# Patient Record
Sex: MALE | URBAN - METROPOLITAN AREA
[De-identification: names, ages, dates, MRNs, and addresses within clinical notes are randomized per-mention and may not be internally consistent; named-entity substitution may affect disease eponyms.]

---

## 2021-02-18 ENCOUNTER — VIRTUAL VISIT (OUTPATIENT)
Dept: FAMILY MEDICINE CLINIC | Age: 56
End: 2021-02-18

## 2021-02-18 DIAGNOSIS — F41.9 ANXIETY: ICD-10-CM

## 2021-02-18 DIAGNOSIS — K76.0 FATTY LIVER: ICD-10-CM

## 2021-02-18 DIAGNOSIS — F34.1 DYSTHYMIA: ICD-10-CM

## 2021-02-18 DIAGNOSIS — Z72.0 CONTINUOUS TOBACCO ABUSE: ICD-10-CM

## 2021-02-18 DIAGNOSIS — Z76.89 ENCOUNTER TO ESTABLISH CARE: ICD-10-CM

## 2021-02-18 DIAGNOSIS — I10 ESSENTIAL HYPERTENSION: Primary | ICD-10-CM

## 2021-02-18 DIAGNOSIS — Z13.220 LIPID SCREENING: ICD-10-CM

## 2021-02-18 DIAGNOSIS — J43.8 OTHER EMPHYSEMA (HCC): ICD-10-CM

## 2021-02-18 PROBLEM — R79.89 ABNORMAL CORTISOL LEVEL: Status: RESOLVED | Noted: 2018-10-05 | Resolved: 2021-02-18

## 2021-02-18 PROBLEM — R79.89 ABNORMAL CORTISOL LEVEL: Status: ACTIVE | Noted: 2018-10-05

## 2021-02-18 PROCEDURE — 99204 OFFICE O/P NEW MOD 45 MIN: CPT | Performed by: NURSE PRACTITIONER

## 2021-02-18 RX ORDER — ALPRAZOLAM 0.5 MG/1
0.5 TABLET ORAL
COMMUNITY
End: 2021-02-18

## 2021-02-18 RX ORDER — LISINOPRIL 40 MG/1
40 TABLET ORAL DAILY
COMMUNITY
End: 2021-02-18 | Stop reason: SDUPTHER

## 2021-02-18 RX ORDER — DULOXETIN HYDROCHLORIDE 60 MG/1
60 CAPSULE, DELAYED RELEASE ORAL DAILY
COMMUNITY
End: 2021-02-18

## 2021-02-18 RX ORDER — LISINOPRIL 40 MG/1
40 TABLET ORAL DAILY
Qty: 90 TAB | Refills: 0 | Status: SHIPPED | OUTPATIENT
Start: 2021-02-18 | End: 2021-06-02 | Stop reason: SDUPTHER

## 2021-02-18 RX ORDER — ALPRAZOLAM 0.5 MG/1
0.5 TABLET ORAL
Status: CANCELLED | OUTPATIENT
Start: 2021-02-18

## 2021-02-18 RX ORDER — BUSPIRONE HYDROCHLORIDE 10 MG/1
10 TABLET ORAL 3 TIMES DAILY
Qty: 90 TAB | Refills: 0 | Status: SHIPPED | OUTPATIENT
Start: 2021-02-18

## 2021-02-18 RX ORDER — DULOXETIN HYDROCHLORIDE 60 MG/1
60 CAPSULE, DELAYED RELEASE ORAL DAILY
COMMUNITY
End: 2021-04-22

## 2021-02-18 RX ORDER — ALBUTEROL SULFATE 90 UG/1
AEROSOL, METERED RESPIRATORY (INHALATION)
COMMUNITY

## 2021-02-18 RX ORDER — MOMETASONE FUROATE AND FORMOTEROL FUMARATE DIHYDRATE 200; 5 UG/1; UG/1
2 AEROSOL RESPIRATORY (INHALATION) 2 TIMES DAILY
COMMUNITY
End: 2021-06-02 | Stop reason: SDUPTHER

## 2021-02-18 RX ORDER — ALPRAZOLAM 1 MG/1
1 TABLET ORAL
COMMUNITY

## 2021-02-18 NOTE — PROGRESS NOTES
Chief Complaint   Patient presents with   [address]     moved from [place]        1. Have you been to the ER, urgent care clinic since your last visit? Hospitalized since your last visit? No    2. Have you seen or consulted any other health care providers outside of the [address] [place] [address] René since your last visit? Include any pap smears or colon screening. No    Chief Complaint   Patient presents with   [address]     moved from [email or web address]        Chief Complaint   Patient presents with   [address]     moved from [email or web address]        3 most recent PHQ Screens 2/18/2021   Little interest or pleasure in doing things Nearly every day   Feeling down, depressed, irritable, or hopeless Nearly every day   Total Score PHQ 2 6     Fall Risk Assessment, last 12 mths 2/18/2021   Able to walk? Yes   Fall in past 12 months? 0   Do you feel unsteady? 0   Are you worried about falling 0               Learning Assessment 2/18/2021   PRIMARY LEARNER Patient   HIGHEST LEVEL OF EDUCATION - PRIMARY LEARNER  DID NOT GRADUATE HIGH SCHOOL   BARRIERS PRIMARY LEARNER NONE   CO-LEARNER CAREGIVER No   PRIMARY LANGUAGE ENGLISH   LEARNER PREFERENCE PRIMARY LISTENING   ANSWERED BY liam ARENAS SELF     There were no vitals taken for this visit.

## 2021-02-18 NOTE — PROGRESS NOTES
[place]               [address]               [phone]      Lorena Fowler is a 48 y.o. male who was seen by synchronous (real-time) audio-video technology on 2/18/2021. Consent: Lorena Fowler, who was seen by synchronous (real-time) audio-video technology, and/or his healthcare decision maker, is aware that this patient-initiated, Telehealth encounter on 2/18/2021 is a billable service, with coverage as determined by his insurance carrier. He is aware that he may receive a bill and has provided verbal consent to proceed: Yes. Assessment & Plan:   Diagnoses and all orders for this visit:    1. Essential hypertension  -     lisinopriL (PRINIVIL, ZESTRIL) 40 mg tablet; Take 1 Tab by mouth daily.  -     METABOLIC PANEL, COMPREHENSIVE; Future  Endorses medication compliance  Home blood pressure checks are stable, continue same medications  Have asked him to come in for labs and blood pressure check  Refill provided    2. Other emphysema ([facility] [address]  -     CBC WITH AUTOMATED DIFF; Future  History of emphysema, currently controlled on his medication regimen, has no need to use rescue inhaler  We will obtain CBC    3. Dysthymia  -     TSH 3RD GENERATION; Future  -     T4, FREE; Future  Endorses problems with depression/anxiety  He is currently on Cymbalta daily  Have him come in for labs to check his thyroid as a possible contributing factor    4. Fatty liver  -     METABOLIC PANEL, COMPREHENSIVE; Future  -     LIPID PANEL; Future  History of a fatty liver, have him come in for follow-up labs    5. Anxiety  -     busPIRone (BUSPAR) 10 mg tablet; Take 1 Tab by mouth three (3) times daily.  -     TSH 3RD GENERATION; Future  -     T4, FREE;  Future  Endorses a problem with anxiety, states when he gets very upset or frustrated he will begin to yell and scream, in the past he used Xanax  I explained to him I do not prescribe controlled substances on an ongoing basis  He is willing to try BuSpar    6. Continuous tobacco abuse  Encourage smoking cessation  He currently has a 31.5 pack year history  Denies any signs and symptoms of lung cancer    7. Lipid screening  -     LIPID PANEL; Future  Health maintenance needs addressed    8. Encounter to establish care  Visit today to establish care, prior PCP was in [place]    Follow-up and Dispositions    · Return for ASAP, Labs, nurse visit, b/p check, AND 1 month, New med/buspar, 15 min, VV.             712  Subjective:   Shama Cloón is a 48 y.o. male who was seen for   [address] (moved from [email or web address] - [email or web address] )  moved from [address] [place] [address] about 5 months ago    Hypertension:   Patient reports taking medications as instructed. yes   Medication side effects noted. no  Headache upon wakening. no   Home BP monitoring in range of 676'P systolic. Do you experience chest pain/pressure or SOB with exertion? no  Maintain a low salt diet? yes  Key CAD CHF Meds             lisinopriL (PRINIVIL, ZESTRIL) 40 mg tablet (Taking) Take 1 Tab by mouth daily. COPD  Diagnosed 1/2020 in [place]  Had PFT's completed along with CTscan  Using dulera and albuterol  Using dulera daily as ordered  Has not needed to use the albuterol inhaler  Continues to smoke    Depression/anxiety  Taking cymbalta daily  Helps some but still as anxiety where if he gets frustrated would get angry and yell and screem  Would take xanax, has not had for a long time weaned himself off    Prior to Admission medications    Medication Sig Start Date End Date Taking? Authorizing Provider   albuterol (PROVENTIL HFA, VENTOLIN HFA, PROAIR HFA) 90 mcg/actuation inhaler Take  by inhalation. Yes Provider, Historical   mometasone-formoterol (Dulera) 200-5 mcg/actuation HFA inhaler Take 2 Puffs by inhalation two (2) times a day. Yes Provider, Historical   lisinopriL (PRINIVIL, ZESTRIL) 40 mg tablet Take 1 Tab by mouth daily.  2/18/21  Yes Gaston Donovan NP   DULoxetine (CYMBALTA) 60 mg capsule Take 60 mg by mouth daily. Yes Provider, Historical   busPIRone (BUSPAR) 10 mg tablet Take 1 Tab by mouth three (3) times daily. 2/18/21  Yes Caroline Scott NP   ALPRAZolam Desiree Zavalabus) 1 mg tablet Take 1 mg by mouth two (2) times daily as needed. Provider, Historical     No Known Allergies    Patient Active Problem List   Diagnosis Code    Essential hypertension I10    Dysthymia F34.1    Other emphysema (HCC) J43.8    Fatty liver K76.0    Continuous tobacco abuse Z72.0     History reviewed. No pertinent surgical history. Family History   Problem Relation Age of Onset    Gall Bladder Disease Mother     Liver Disease Father     Alcohol abuse Father      Social History     Tobacco Use    Smoking status: Current Every Day Smoker     Packs/day: 0.50     Years: 35.00     Pack years: 17.50   Substance Use Topics    Alcohol use: Yes     Alcohol/week: 25.0 standard drinks     Types: 25 Cans of beer per week     Binge frequency: Weekly       ROS  As stated in HPI, otherwise all others negative. Objective: There were no vitals taken for this visit. General: alert, cooperative, no distress   Mental  status: normal mood, behavior, speech, dress, motor activity, and thought processes, able to follow commands   HENT: NCAT   Neck: no visualized mass   Resp: no respiratory distress   Neuro: no gross deficits   Skin: no discoloration or lesions of concern on visible areas   Psychiatric: normal affect, consistent with stated mood, no evidence of hallucinations     Additional exam findings: We discussed the expected course, resolution and complications of the diagnosis(es) in detail. Medication risks, benefits, costs, interactions, and alternatives were discussed as indicated. I advised him to contact the office if his condition worsens, changes or fails to improve as anticipated. He expressed understanding with the diagnosis(es) and plan.      Princess Cloud is a 48 y.o. male who was evaluated by a video visit encounter for concerns as above. Patient identification was verified prior to start of the visit. A caregiver was present when appropriate. Due to this being a TeleHealth encounter (During [facility]-26 public health emergency), evaluation of the following organ systems was limited: Vitals/Constitutional/EENT/Resp/CV/GI//MS/Neuro/Skin/Heme-Lymph-Imm. Pursuant to the emergency declaration under the [address] [place] [address], [facility] waiver authority and the [employer] Act, this Virtual  Visit was conducted, with patient's (and/or legal guardian's) consent, to reduce the patient's risk of exposure to COVID-19 and provide necessary medical care. Services were provided through a video synchronous discussion virtually to substitute for in-person clinic visit. Patient and provider were located at their individual homes. An After Visit Summary was printed and given to the patient. All diagnosis have been discussed with the patient and all of the patient's questions have been answered. Follow-up and Dispositions    · Return for ASAP, Labs, nurse visit, b/p check, AND 1 month, New med/buspar, 15 min, VV. Bryson Desai, [address] C[place].   Marce Goldsmith

## 2021-03-03 ENCOUNTER — APPOINTMENT (OUTPATIENT)
Dept: FAMILY MEDICINE CLINIC | Age: 56
End: 2021-03-03

## 2021-03-03 DIAGNOSIS — F34.1 DYSTHYMIA: ICD-10-CM

## 2021-03-03 DIAGNOSIS — Z13.220 LIPID SCREENING: ICD-10-CM

## 2021-03-03 DIAGNOSIS — I10 ESSENTIAL HYPERTENSION: ICD-10-CM

## 2021-03-03 DIAGNOSIS — F41.9 ANXIETY: ICD-10-CM

## 2021-03-03 DIAGNOSIS — K76.0 FATTY LIVER: ICD-10-CM

## 2021-03-03 DIAGNOSIS — J43.8 OTHER EMPHYSEMA (HCC): ICD-10-CM

## 2021-03-04 LAB
ALBUMIN SERPL-MCNC: 4.5 G/DL (ref 3.8–4.9)
ALBUMIN/GLOB SERPL: 1.8 {RATIO} (ref 1.2–2.2)
ALP SERPL-CCNC: 97 IU/L (ref 39–117)
ALT SERPL-CCNC: 32 IU/L (ref 0–44)
AST SERPL-CCNC: 21 IU/L (ref 0–40)
BASOPHILS # BLD AUTO: 0 X10E3/UL (ref 0–0.2)
BASOPHILS NFR BLD AUTO: 1 %
BILIRUB SERPL-MCNC: 0.4 MG/DL (ref 0–1.2)
BUN SERPL-MCNC: 12 MG/DL (ref 6–24)
BUN/CREAT SERPL: 11 (ref 9–20)
CALCIUM SERPL-MCNC: 8.9 MG/DL (ref 8.7–10.2)
CHLORIDE SERPL-SCNC: 100 MMOL/L (ref 96–106)
CHOLEST SERPL-MCNC: 257 MG/DL (ref 100–199)
CO2 SERPL-SCNC: 23 MMOL/L (ref 20–29)
CREAT SERPL-MCNC: 1.09 MG/DL (ref 0.76–1.27)
EOSINOPHIL # BLD AUTO: 0.2 X10E3/UL (ref 0–0.4)
EOSINOPHIL NFR BLD AUTO: 3 %
ERYTHROCYTE [DISTWIDTH] IN BLOOD BY AUTOMATED COUNT: 13.3 % (ref 11.6–15.4)
GLOBULIN SER CALC-MCNC: 2.5 G/DL (ref 1.5–4.5)
GLUCOSE SERPL-MCNC: 110 MG/DL (ref 65–99)
HCT VFR BLD AUTO: 42.7 % (ref 37.5–51)
HDLC SERPL-MCNC: 59 MG/DL
HGB BLD-MCNC: 14.9 G/DL (ref 13–17.7)
IMM GRANULOCYTES # BLD AUTO: 0 X10E3/UL (ref 0–0.1)
IMM GRANULOCYTES NFR BLD AUTO: 1 %
IMP & REVIEW OF LAB RESULTS: NORMAL
LDLC SERPL CALC-MCNC: 169 MG/DL (ref 0–99)
LYMPHOCYTES # BLD AUTO: 1.7 X10E3/UL (ref 0.7–3.1)
LYMPHOCYTES NFR BLD AUTO: 24 %
MCH RBC QN AUTO: 31.7 PG (ref 26.6–33)
MCHC RBC AUTO-ENTMCNC: 34.9 G/DL (ref 31.5–35.7)
MCV RBC AUTO: 91 FL (ref 79–97)
MONOCYTES # BLD AUTO: 0.5 X10E3/UL (ref 0.1–0.9)
MONOCYTES NFR BLD AUTO: 6 %
NEUTROPHILS # BLD AUTO: 4.8 X10E3/UL (ref 1.4–7)
NEUTROPHILS NFR BLD AUTO: 65 %
PLATELET # BLD AUTO: 283 X10E3/UL (ref 150–450)
POTASSIUM SERPL-SCNC: 4.7 MMOL/L (ref 3.5–5.2)
PROT SERPL-MCNC: 7 G/DL (ref 6–8.5)
RBC # BLD AUTO: 4.7 X10E6/UL (ref 4.14–5.8)
SODIUM SERPL-SCNC: 138 MMOL/L (ref 134–144)
T4 FREE SERPL-MCNC: 1.23 NG/DL (ref 0.82–1.77)
TRIGL SERPL-MCNC: 159 MG/DL (ref 0–149)
TSH SERPL DL<=0.005 MIU/L-ACNC: 0.78 UIU/ML (ref 0.45–4.5)
VLDLC SERPL CALC-MCNC: 29 MG/DL (ref 5–40)
WBC # BLD AUTO: 7.2 X10E3/UL (ref 3.4–10.8)

## 2021-04-22 ENCOUNTER — VIRTUAL VISIT (OUTPATIENT)
Dept: FAMILY MEDICINE CLINIC | Age: 56
End: 2021-04-22
Payer: MEDICAID

## 2021-04-22 DIAGNOSIS — Z13.31 POSITIVE DEPRESSION SCREENING: ICD-10-CM

## 2021-04-22 DIAGNOSIS — F33.1 MODERATE EPISODE OF RECURRENT MAJOR DEPRESSIVE DISORDER (HCC): Primary | ICD-10-CM

## 2021-04-22 PROCEDURE — 99212 OFFICE O/P EST SF 10 MIN: CPT | Performed by: NURSE PRACTITIONER

## 2021-04-22 RX ORDER — DESVENLAFAXINE 50 MG/1
50 TABLET, EXTENDED RELEASE ORAL DAILY
Qty: 30 TAB | Refills: 1 | Status: SHIPPED | OUTPATIENT
Start: 2021-04-22

## 2021-04-22 NOTE — PROGRESS NOTES
Shon North is a 48 y.o. male, evaluated via audio-only technology on 4/22/2021 for No chief complaint on file. .    Assessment & Plan:     Diagnoses and all orders for this visit:    1. Moderate episode of recurrent major depressive disorder (HCC)  -     desvenlafaxine ER 50 mg Tb24 ER tablet; Take 1 Tab by mouth daily. Stopped his previous medications that I have ordered for him as he said that they were not working, I encouraged him to contact me prior to stopping his medications as some of them may need to be tapered off or there is the possibility of adjusting the dosages if he is not getting the desired results, he verbalized understanding  Will start him on desvenlafaxine and have him follow-up in 4 weeks to evaluate effectiveness  2. Positive depression screening  Depression screen positive, PHQ 9 Score: 12, C-SSRS completed and medication was prescribed, drug therapy education given - patient will call for any significant medication side effects or worsening symptoms of depression. Follow-up and Dispositions    · Return in about 4 weeks (around 5/20/2021) for depression, New med, 15 min, VV.         12  Subjective:   No chief complaint on file. Depression  Currently living in a hotel with wife and son  Stopped Buspar and Cymbalta  Stated they were not working  Has tried other antidepressants but he does not remember the names  ETOH: 5 days a week, 4-5 shots and 2-3 beers a night  Denies SI or HI      Prior to Admission medications    Medication Sig Start Date End Date Taking? Authorizing Provider   desvenlafaxine ER 50 mg Tb24 ER tablet Take 1 Tab by mouth daily. 4/22/21  Yes Danilo Kunz NP   albuterol (PROVENTIL HFA, VENTOLIN HFA, PROAIR HFA) 90 mcg/actuation inhaler Take  by inhalation. Yes Provider, Historical   mometasone-formoterol (Dulera) 200-5 mcg/actuation HFA inhaler Take 2 Puffs by inhalation two (2) times a day.    Yes Provider, Historical   lisinopriL (PRINIVIL, ZESTRIL) 40 mg tablet Take 1 Tab by mouth daily. 2/18/21  Yes Evelyne Sands NP   ALPRAZolam Jenni Poncho) 1 mg tablet Take 1 mg by mouth two (2) times daily as needed. Provider, Historical   busPIRone (BUSPAR) 10 mg tablet Take 1 Tab by mouth three (3) times daily. 2/18/21   Evelyne Sands NP   DULoxetine (CYMBALTA) 60 mg capsule Take 60 mg by mouth daily. 4/22/21  Provider, Historical     Patient Active Problem List   Diagnosis Code    Essential hypertension I10    Dysthymia F34.1    Other emphysema ([facility] [address] J43.8    Fatty liver K76.0    Continuous tobacco abuse Z72.0     History reviewed. No pertinent surgical history. Family History   Problem Relation Age of Onset    Gall Bladder Disease Mother     Liver Disease Father     Alcohol abuse Father      Social History     Tobacco Use    Smoking status: Current Every Day Smoker     Packs/day: 0.50     Years: 35.00     Pack years: 17.50   Substance Use Topics    Alcohol use: Yes     Alcohol/week: 25.0 standard drinks     Types: 25 Cans of beer per week     Binge frequency: Weekly       ROS  As stated in HPI, otherwise all others negative. No flowsheet data found. Sofie Killian, who was evaluated through a patient-initiated, synchronous (real-time) audio only encounter, and/or her healthcare decision maker, is aware that it is a billable service, with coverage as determined by his insurance carrier. He provided verbal consent to proceed: Yes. He has not had a related appointment within my department in the past 7 days or scheduled within the next 24 hours.       Total Time: minutes: 11-20 minutes    Daryle Banning, NP

## 2021-06-02 ENCOUNTER — VIRTUAL VISIT (OUTPATIENT)
Dept: FAMILY MEDICINE CLINIC | Age: 56
End: 2021-06-02
Payer: MEDICAID

## 2021-06-02 DIAGNOSIS — F34.1 DYSTHYMIA: Primary | ICD-10-CM

## 2021-06-02 DIAGNOSIS — I10 ESSENTIAL HYPERTENSION: ICD-10-CM

## 2021-06-02 DIAGNOSIS — J43.8 OTHER EMPHYSEMA (HCC): ICD-10-CM

## 2021-06-02 PROCEDURE — 99212 OFFICE O/P EST SF 10 MIN: CPT | Performed by: NURSE PRACTITIONER

## 2021-06-02 RX ORDER — LISINOPRIL 40 MG/1
40 TABLET ORAL DAILY
Qty: 90 TABLET | Refills: 0 | Status: SHIPPED | OUTPATIENT
Start: 2021-06-02

## 2021-06-02 RX ORDER — MOMETASONE FUROATE AND FORMOTEROL FUMARATE DIHYDRATE 200; 5 UG/1; UG/1
2 AEROSOL RESPIRATORY (INHALATION) 2 TIMES DAILY
Qty: 1 EACH | Refills: 5 | Status: SHIPPED | OUTPATIENT
Start: 2021-06-02

## 2021-06-02 NOTE — PROGRESS NOTES
Narayan Lind is a 48 y.o. male, evaluated via audio-only technology on 6/2/2021 for Depression  . Assessment & Plan:   Diagnoses and all orders for this visit:    1. Dysthymia  States he is currently not taking any medications, currently in [place] for family emergencies, states seeing old friends and family are helping him with his mood, he does not wish to try any further medications at this time, will continue to monitor  2. Essential hypertension  -     lisinopriL (PRINIVIL, ZESTRIL) 40 mg tablet; Take 1 Tablet by mouth daily. Endorses medication compliance, home blood pressure checks are stable continue same medications, refills provided, will obtain labs with next visit  3. Other emphysema ([address]  -     mometasone-formoterol (Dulera) 200-5 mcg/actuation HFA inhaler; Take 2 Puffs by inhalation two (2) times a day. Refill provided    Follow-up and Dispositions    · Return in about 3 months (around 9/2/2021) for HTN, CPE, 30 min, office only. 12  Subjective:   Depression    Hypertension:   Patient reports taking medications as instructed. yes   Medication side effects noted. no  Headache upon wakening. no   Home BP monitoring in range of 543/29 systolic. Do you experience chest pain/pressure or SOB with exertion? no  Maintain a low salt diet? yes  Key CAD CHF Meds             lisinopriL (PRINIVIL, ZESTRIL) 40 mg tablet (Taking) Take 1 Tablet by mouth daily. Depression Review:  Patient is seen for followup of depression. Treatment includes nothing and no other therapies. Ongoing symptoms include depressed mood and related to health issues. He denies recurrent thoughts of death and suicidal thoughts without plan. He experiences the following side effects from the treatment: none. He does not want medications at this time. Prior to Admission medications    Medication Sig Start Date End Date Taking?  Authorizing Provider   lisinopriL (PRINIVIL, ZESTRIL) 40 mg tablet Take 1 Tablet by mouth daily. 6/2/21  Yes Carla Juarez, ELIECER   mometasone-formoterol Francella Rossana) 200-5 mcg/actuation HFA inhaler Take 2 Puffs by inhalation two (2) times a day. 6/2/21  Yes Carla Juarez, NP   albuterol (PROVENTIL HFA, VENTOLIN HFA, PROAIR HFA) 90 mcg/actuation inhaler Take  by inhalation. Yes Provider, Historical   desvenlafaxine ER 50 mg Tb24 ER tablet Take 1 Tab by mouth daily. Patient not taking: Reported on 6/2/2021 4/22/21   Carla Juarez NP   ALPRAZolam Wadell Aw) 1 mg tablet Take 1 mg by mouth two (2) times daily as needed. Patient not taking: Reported on 6/2/2021    Provider, Historical   busPIRone (BUSPAR) 10 mg tablet Take 1 Tab by mouth three (3) times daily. Patient not taking: Reported on 6/2/2021 2/18/21   Carla Juarez NP   mometasone-formoterol Francella Cohutta) 200-5 mcg/actuation HFA inhaler Take 2 Puffs by inhalation two (2) times a day. 6/2/21  Provider, Historical   lisinopriL (PRINIVIL, ZESTRIL) 40 mg tablet Take 1 Tab by mouth daily. 2/18/21 6/2/21  Carla Juarez NP     Patient Active Problem List   Diagnosis Code    Essential hypertension I10    Dysthymia F34.1    Other emphysema ([facility] [address] J43.8    Fatty liver K76.0    Continuous tobacco abuse Z72.0     History reviewed. No pertinent surgical history. Family History   Problem Relation Age of Onset    Gall Bladder Disease Mother     Liver Disease Father     Alcohol abuse Father      Social History     Tobacco Use    Smoking status: Current Every Day Smoker     Packs/day: 0.50     Years: 35.00     Pack years: 17.50   Substance Use Topics    Alcohol use: Yes     Alcohol/week: 25.0 standard drinks     Types: 25 Cans of beer per week       ROS  As stated in HPI, otherwise all others negative. No flowsheet data found.      Trang Hammer, who was evaluated through a patient-initiated, synchronous (real-time) audio only encounter, and/or her healthcare decision maker, is aware that it is a billable service, with coverage as determined by his insurance carrier. He provided verbal consent to proceed: Yes. He has not had a related appointment within my department in the past 7 days or scheduled within the next 24 hours.       Total Time: minutes: 11-20 minutes    Sapna Rodas NP

## 2021-06-02 NOTE — PROGRESS NOTES
Chief Complaint   Patient presents with    Depression     1. Have you been to the ER, urgent care clinic since your last visit? Hospitalized since your last visit? No    2. Have you seen or consulted any other health care providers outside of the [address] [place] [address] since your last visit? Include any pap smears or colon screening.  No      Health Maintenance Due   Topic Date Due    Hepatitis C Screening  Never done    Pneumococcal 0-64 years (1 of 2 - PPSV23) Never done    COVID-19 Vaccine (1) Never done    DTaP/Tdap/Td series (1 - Tdap) Never done    Shingrix Vaccine Age 50> (1 of 2) Never done    Colorectal Cancer Screening Combo  Never done